# Patient Record
Sex: MALE | Race: WHITE | Employment: FULL TIME | URBAN - METROPOLITAN AREA
[De-identification: names, ages, dates, MRNs, and addresses within clinical notes are randomized per-mention and may not be internally consistent; named-entity substitution may affect disease eponyms.]

---

## 2018-08-23 ENCOUNTER — HOSPITAL ENCOUNTER (INPATIENT)
Age: 48
LOS: 1 days | Discharge: HOME | DRG: 392 | End: 2018-08-25
Attending: EMERGENCY MEDICINE | Admitting: FAMILY MEDICINE
Payer: COMMERCIAL

## 2018-08-23 ENCOUNTER — APPOINTMENT (OUTPATIENT)
Dept: CT IMAGING | Age: 48
DRG: 392 | End: 2018-08-23
Payer: COMMERCIAL

## 2018-08-23 DIAGNOSIS — K57.32 DIVERTICULITIS OF COLON: Primary | ICD-10-CM

## 2018-08-23 LAB
ABSOLUTE EOS #: 0.2 K/UL (ref 0–0.4)
ABSOLUTE IMMATURE GRANULOCYTE: ABNORMAL K/UL (ref 0–0.3)
ABSOLUTE LYMPH #: 1.6 K/UL (ref 1–4.8)
ABSOLUTE MONO #: 0.9 K/UL (ref 0.1–1.2)
ANION GAP SERPL CALCULATED.3IONS-SCNC: 12 MMOL/L (ref 9–17)
BASOPHILS # BLD: 1 % (ref 0–2)
BASOPHILS ABSOLUTE: 0.1 K/UL (ref 0–0.2)
BUN BLDV-MCNC: 21 MG/DL (ref 6–20)
BUN/CREAT BLD: ABNORMAL (ref 9–20)
CALCIUM SERPL-MCNC: 9.5 MG/DL (ref 8.6–10.4)
CHLORIDE BLD-SCNC: 98 MMOL/L (ref 98–107)
CO2: 25 MMOL/L (ref 20–31)
CREAT SERPL-MCNC: 1.01 MG/DL (ref 0.7–1.2)
DIFFERENTIAL TYPE: ABNORMAL
EOSINOPHILS RELATIVE PERCENT: 1 % (ref 1–4)
GFR AFRICAN AMERICAN: >60 ML/MIN
GFR NON-AFRICAN AMERICAN: >60 ML/MIN
GFR SERPL CREATININE-BSD FRML MDRD: ABNORMAL ML/MIN/{1.73_M2}
GFR SERPL CREATININE-BSD FRML MDRD: ABNORMAL ML/MIN/{1.73_M2}
GLUCOSE BLD-MCNC: 111 MG/DL (ref 70–99)
HCT VFR BLD CALC: 40.1 % (ref 41–53)
HEMOGLOBIN: 13.8 G/DL (ref 13.5–17.5)
IMMATURE GRANULOCYTES: ABNORMAL %
LIPASE: 18 U/L (ref 13–60)
LYMPHOCYTES # BLD: 12 % (ref 24–44)
MCH RBC QN AUTO: 30.3 PG (ref 26–34)
MCHC RBC AUTO-ENTMCNC: 34.6 G/DL (ref 31–37)
MCV RBC AUTO: 87.7 FL (ref 80–100)
MONOCYTES # BLD: 7 % (ref 2–11)
NRBC AUTOMATED: ABNORMAL PER 100 WBC
PDW BLD-RTO: 13.9 % (ref 12.5–15.4)
PLATELET # BLD: 185 K/UL (ref 140–450)
PLATELET ESTIMATE: ABNORMAL
PMV BLD AUTO: 8.9 FL (ref 6–12)
POTASSIUM SERPL-SCNC: 4.3 MMOL/L (ref 3.7–5.3)
RBC # BLD: 4.57 M/UL (ref 4.5–5.9)
RBC # BLD: ABNORMAL 10*6/UL
SEG NEUTROPHILS: 79 % (ref 36–66)
SEGMENTED NEUTROPHILS ABSOLUTE COUNT: 10.6 K/UL (ref 1.8–7.7)
SODIUM BLD-SCNC: 135 MMOL/L (ref 135–144)
WBC # BLD: 13.3 K/UL (ref 3.5–11)
WBC # BLD: ABNORMAL 10*3/UL

## 2018-08-23 PROCEDURE — 83690 ASSAY OF LIPASE: CPT

## 2018-08-23 PROCEDURE — 2580000003 HC RX 258: Performed by: EMERGENCY MEDICINE

## 2018-08-23 PROCEDURE — 36415 COLL VENOUS BLD VENIPUNCTURE: CPT

## 2018-08-23 PROCEDURE — 80048 BASIC METABOLIC PNL TOTAL CA: CPT

## 2018-08-23 PROCEDURE — 6360000004 HC RX CONTRAST MEDICATION: Performed by: EMERGENCY MEDICINE

## 2018-08-23 PROCEDURE — 99285 EMERGENCY DEPT VISIT HI MDM: CPT

## 2018-08-23 PROCEDURE — 85025 COMPLETE CBC W/AUTO DIFF WBC: CPT

## 2018-08-23 PROCEDURE — 74177 CT ABD & PELVIS W/CONTRAST: CPT

## 2018-08-23 RX ORDER — 0.9 % SODIUM CHLORIDE 0.9 %
60 INTRAVENOUS SOLUTION INTRAVENOUS ONCE
Status: COMPLETED | OUTPATIENT
Start: 2018-08-23 | End: 2018-08-23

## 2018-08-23 RX ORDER — SODIUM CHLORIDE 0.9 % (FLUSH) 0.9 %
10 SYRINGE (ML) INJECTION ONCE
Status: COMPLETED | OUTPATIENT
Start: 2018-08-23 | End: 2018-08-23

## 2018-08-23 RX ORDER — 0.9 % SODIUM CHLORIDE 0.9 %
1000 INTRAVENOUS SOLUTION INTRAVENOUS ONCE
Status: COMPLETED | OUTPATIENT
Start: 2018-08-23 | End: 2018-08-24

## 2018-08-23 RX ADMIN — SODIUM CHLORIDE 1000 ML: 9 INJECTION, SOLUTION INTRAVENOUS at 22:33

## 2018-08-23 RX ADMIN — SODIUM CHLORIDE 60 ML: 9 INJECTION, SOLUTION INTRAVENOUS at 23:01

## 2018-08-23 RX ADMIN — Medication 10 ML: at 23:01

## 2018-08-23 RX ADMIN — IOPAMIDOL 75 ML: 755 INJECTION, SOLUTION INTRAVENOUS at 23:03

## 2018-08-23 ASSESSMENT — PAIN DESCRIPTION - PAIN TYPE: TYPE: ACUTE PAIN

## 2018-08-23 ASSESSMENT — PAIN DESCRIPTION - DESCRIPTORS: DESCRIPTORS: DULL;CRAMPING

## 2018-08-23 ASSESSMENT — PAIN SCALES - GENERAL: PAINLEVEL_OUTOF10: 4

## 2018-08-23 ASSESSMENT — PAIN DESCRIPTION - FREQUENCY: FREQUENCY: CONTINUOUS

## 2018-08-23 ASSESSMENT — PAIN DESCRIPTION - LOCATION: LOCATION: ABDOMEN

## 2018-08-23 ASSESSMENT — PAIN DESCRIPTION - PROGRESSION: CLINICAL_PROGRESSION: GRADUALLY WORSENING

## 2018-08-23 ASSESSMENT — PAIN DESCRIPTION - ORIENTATION: ORIENTATION: RIGHT;LOWER

## 2018-08-24 PROBLEM — G47.00 INSOMNIA: Status: ACTIVE | Noted: 2018-08-24

## 2018-08-24 PROBLEM — F50.81 BINGE EATING DISORDER: Status: ACTIVE | Noted: 2017-10-25

## 2018-08-24 PROBLEM — K21.9 GASTROESOPHAGEAL REFLUX DISEASE WITHOUT ESOPHAGITIS: Status: ACTIVE | Noted: 2017-06-26

## 2018-08-24 PROBLEM — F41.9 ANXIETY: Status: ACTIVE | Noted: 2018-08-24

## 2018-08-24 PROBLEM — E78.2 MIXED HYPERLIPIDEMIA: Status: ACTIVE | Noted: 2017-06-26

## 2018-08-24 PROBLEM — I10 ESSENTIAL HYPERTENSION: Status: ACTIVE | Noted: 2018-08-24

## 2018-08-24 PROBLEM — E66.9 OBESITY: Status: ACTIVE | Noted: 2018-08-24

## 2018-08-24 PROBLEM — F32.A DEPRESSIVE DISORDER: Status: ACTIVE | Noted: 2018-08-24

## 2018-08-24 PROBLEM — M10.9 GOUT: Status: ACTIVE | Noted: 2017-06-26

## 2018-08-24 PROBLEM — K57.80 DIVERTICULITIS OF INTESTINE WITH PERFORATION WITHOUT ABSCESS OR BLEEDING: Status: ACTIVE | Noted: 2018-08-24

## 2018-08-24 PROBLEM — E66.01 MORBID OBESITY DUE TO EXCESS CALORIES (HCC): Status: ACTIVE | Noted: 2018-08-24

## 2018-08-24 LAB
ABSOLUTE EOS #: 0.2 K/UL (ref 0–0.4)
ABSOLUTE IMMATURE GRANULOCYTE: ABNORMAL K/UL (ref 0–0.3)
ABSOLUTE LYMPH #: 1.2 K/UL (ref 1–4.8)
ABSOLUTE MONO #: 0.8 K/UL (ref 0.1–1.2)
ANION GAP SERPL CALCULATED.3IONS-SCNC: 14 MMOL/L (ref 9–17)
BASOPHILS # BLD: 1 % (ref 0–2)
BASOPHILS ABSOLUTE: 0 K/UL (ref 0–0.2)
BILIRUBIN URINE: NEGATIVE
BUN BLDV-MCNC: 17 MG/DL (ref 6–20)
BUN/CREAT BLD: ABNORMAL (ref 9–20)
C-REACTIVE PROTEIN: 78.5 MG/L (ref 0–5)
CALCIUM SERPL-MCNC: 8.8 MG/DL (ref 8.6–10.4)
CHLORIDE BLD-SCNC: 100 MMOL/L (ref 98–107)
CHOLESTEROL/HDL RATIO: 3.1
CHOLESTEROL: 141 MG/DL
CO2: 24 MMOL/L (ref 20–31)
COLOR: YELLOW
COMMENT UA: NORMAL
CREAT SERPL-MCNC: 0.92 MG/DL (ref 0.7–1.2)
DIFFERENTIAL TYPE: ABNORMAL
EOSINOPHILS RELATIVE PERCENT: 2 % (ref 1–4)
FOLATE: 5.1 NG/ML
GFR AFRICAN AMERICAN: >60 ML/MIN
GFR NON-AFRICAN AMERICAN: >60 ML/MIN
GFR SERPL CREATININE-BSD FRML MDRD: ABNORMAL ML/MIN/{1.73_M2}
GFR SERPL CREATININE-BSD FRML MDRD: ABNORMAL ML/MIN/{1.73_M2}
GLUCOSE BLD-MCNC: 112 MG/DL (ref 70–99)
GLUCOSE URINE: NEGATIVE
HCT VFR BLD CALC: 38 % (ref 41–53)
HDLC SERPL-MCNC: 46 MG/DL
HEMOGLOBIN: 13.2 G/DL (ref 13.5–17.5)
IMMATURE GRANULOCYTES: ABNORMAL %
KETONES, URINE: NEGATIVE
LACTIC ACID: 0.6 MMOL/L (ref 0.5–2.2)
LDL CHOLESTEROL: 83 MG/DL (ref 0–130)
LEUKOCYTE ESTERASE, URINE: NEGATIVE
LYMPHOCYTES # BLD: 12 % (ref 24–44)
MCH RBC QN AUTO: 30.7 PG (ref 26–34)
MCHC RBC AUTO-ENTMCNC: 34.8 G/DL (ref 31–37)
MCV RBC AUTO: 88.3 FL (ref 80–100)
MONOCYTES # BLD: 7 % (ref 2–11)
NITRITE, URINE: NEGATIVE
NRBC AUTOMATED: ABNORMAL PER 100 WBC
PDW BLD-RTO: 13.9 % (ref 12.5–15.4)
PH UA: 5.5 (ref 5–8)
PLATELET # BLD: 180 K/UL (ref 140–450)
PLATELET ESTIMATE: ABNORMAL
PMV BLD AUTO: 8.6 FL (ref 6–12)
POTASSIUM SERPL-SCNC: 4.1 MMOL/L (ref 3.7–5.3)
PROTEIN UA: NEGATIVE
RBC # BLD: 4.3 M/UL (ref 4.5–5.9)
RBC # BLD: ABNORMAL 10*6/UL
SEG NEUTROPHILS: 78 % (ref 36–66)
SEGMENTED NEUTROPHILS ABSOLUTE COUNT: 8.4 K/UL (ref 1.8–7.7)
SODIUM BLD-SCNC: 138 MMOL/L (ref 135–144)
SPECIFIC GRAVITY UA: 1.01 (ref 1–1.03)
TRIGL SERPL-MCNC: 61 MG/DL
TSH SERPL DL<=0.05 MIU/L-ACNC: 4.41 MIU/L (ref 0.3–5)
TURBIDITY: CLEAR
URINE HGB: NEGATIVE
UROBILINOGEN, URINE: NORMAL
VITAMIN B-12: 461 PG/ML (ref 232–1245)
VLDLC SERPL CALC-MCNC: NORMAL MG/DL (ref 1–30)
WBC # BLD: 10.7 K/UL (ref 3.5–11)
WBC # BLD: ABNORMAL 10*3/UL

## 2018-08-24 PROCEDURE — 1210000000 HC MED SURG R&B

## 2018-08-24 PROCEDURE — 80061 LIPID PANEL: CPT

## 2018-08-24 PROCEDURE — 86140 C-REACTIVE PROTEIN: CPT

## 2018-08-24 PROCEDURE — 36415 COLL VENOUS BLD VENIPUNCTURE: CPT

## 2018-08-24 PROCEDURE — 82607 VITAMIN B-12: CPT

## 2018-08-24 PROCEDURE — 2500000003 HC RX 250 WO HCPCS: Performed by: EMERGENCY MEDICINE

## 2018-08-24 PROCEDURE — 85025 COMPLETE CBC W/AUTO DIFF WBC: CPT

## 2018-08-24 PROCEDURE — 6360000002 HC RX W HCPCS: Performed by: EMERGENCY MEDICINE

## 2018-08-24 PROCEDURE — 2580000003 HC RX 258: Performed by: NURSE PRACTITIONER

## 2018-08-24 PROCEDURE — 6360000002 HC RX W HCPCS: Performed by: NURSE PRACTITIONER

## 2018-08-24 PROCEDURE — 84443 ASSAY THYROID STIM HORMONE: CPT

## 2018-08-24 PROCEDURE — 80048 BASIC METABOLIC PNL TOTAL CA: CPT

## 2018-08-24 PROCEDURE — 99222 1ST HOSP IP/OBS MODERATE 55: CPT | Performed by: FAMILY MEDICINE

## 2018-08-24 PROCEDURE — 2580000003 HC RX 258: Performed by: INTERNAL MEDICINE

## 2018-08-24 PROCEDURE — 6360000002 HC RX W HCPCS: Performed by: FAMILY MEDICINE

## 2018-08-24 PROCEDURE — 82746 ASSAY OF FOLIC ACID SERUM: CPT

## 2018-08-24 PROCEDURE — 83605 ASSAY OF LACTIC ACID: CPT

## 2018-08-24 PROCEDURE — 2500000003 HC RX 250 WO HCPCS: Performed by: NURSE PRACTITIONER

## 2018-08-24 RX ORDER — SODIUM CHLORIDE 9 MG/ML
INJECTION, SOLUTION INTRAVENOUS CONTINUOUS
Status: DISCONTINUED | OUTPATIENT
Start: 2018-08-24 | End: 2018-08-25 | Stop reason: HOSPADM

## 2018-08-24 RX ORDER — SILDENAFIL 100 MG/1
100 TABLET, FILM COATED ORAL DAILY PRN
COMMUNITY

## 2018-08-24 RX ORDER — SODIUM CHLORIDE 0.9 % (FLUSH) 0.9 %
10 SYRINGE (ML) INJECTION EVERY 12 HOURS SCHEDULED
Status: DISCONTINUED | OUTPATIENT
Start: 2018-08-24 | End: 2018-08-25 | Stop reason: HOSPADM

## 2018-08-24 RX ORDER — SODIUM CHLORIDE 0.9 % (FLUSH) 0.9 %
10 SYRINGE (ML) INJECTION PRN
Status: DISCONTINUED | OUTPATIENT
Start: 2018-08-24 | End: 2018-08-25 | Stop reason: HOSPADM

## 2018-08-24 RX ORDER — ALLOPURINOL 100 MG/1
100 TABLET ORAL DAILY
COMMUNITY

## 2018-08-24 RX ORDER — ZOLPIDEM TARTRATE 10 MG/1
10 TABLET ORAL NIGHTLY
COMMUNITY

## 2018-08-24 RX ORDER — POTASSIUM CHLORIDE 7.45 MG/ML
10 INJECTION INTRAVENOUS PRN
Status: DISCONTINUED | OUTPATIENT
Start: 2018-08-24 | End: 2018-08-25 | Stop reason: HOSPADM

## 2018-08-24 RX ORDER — ASPIRIN 81 MG/1
81 TABLET ORAL DAILY
COMMUNITY

## 2018-08-24 RX ORDER — LOSARTAN POTASSIUM 50 MG/1
50 TABLET ORAL DAILY
COMMUNITY

## 2018-08-24 RX ORDER — CIPROFLOXACIN 2 MG/ML
400 INJECTION, SOLUTION INTRAVENOUS ONCE
Status: COMPLETED | OUTPATIENT
Start: 2018-08-24 | End: 2018-08-24

## 2018-08-24 RX ORDER — MAGNESIUM SULFATE 1 G/100ML
1 INJECTION INTRAVENOUS PRN
Status: DISCONTINUED | OUTPATIENT
Start: 2018-08-24 | End: 2018-08-25 | Stop reason: HOSPADM

## 2018-08-24 RX ORDER — CIPROFLOXACIN 2 MG/ML
400 INJECTION, SOLUTION INTRAVENOUS EVERY 12 HOURS
Status: DISCONTINUED | OUTPATIENT
Start: 2018-08-24 | End: 2018-08-25 | Stop reason: HOSPADM

## 2018-08-24 RX ORDER — CHLORAL HYDRATE 500 MG
1000 CAPSULE ORAL DAILY
COMMUNITY

## 2018-08-24 RX ORDER — LORAZEPAM 2 MG/ML
0.5 INJECTION INTRAMUSCULAR ONCE
Status: COMPLETED | OUTPATIENT
Start: 2018-08-24 | End: 2018-08-24

## 2018-08-24 RX ORDER — ONDANSETRON 2 MG/ML
4 INJECTION INTRAMUSCULAR; INTRAVENOUS EVERY 6 HOURS PRN
Status: DISCONTINUED | OUTPATIENT
Start: 2018-08-24 | End: 2018-08-25 | Stop reason: HOSPADM

## 2018-08-24 RX ORDER — MORPHINE SULFATE 2 MG/ML
2 INJECTION, SOLUTION INTRAMUSCULAR; INTRAVENOUS
Status: DISCONTINUED | OUTPATIENT
Start: 2018-08-24 | End: 2018-08-25 | Stop reason: HOSPADM

## 2018-08-24 RX ORDER — NICOTINE 21 MG/24HR
1 PATCH, TRANSDERMAL 24 HOURS TRANSDERMAL DAILY PRN
Status: DISCONTINUED | OUTPATIENT
Start: 2018-08-24 | End: 2018-08-24

## 2018-08-24 RX ORDER — POTASSIUM CHLORIDE 20MEQ/15ML
40 LIQUID (ML) ORAL PRN
Status: DISCONTINUED | OUTPATIENT
Start: 2018-08-24 | End: 2018-08-25 | Stop reason: HOSPADM

## 2018-08-24 RX ORDER — POTASSIUM CHLORIDE 20 MEQ/1
40 TABLET, EXTENDED RELEASE ORAL PRN
Status: DISCONTINUED | OUTPATIENT
Start: 2018-08-24 | End: 2018-08-25 | Stop reason: HOSPADM

## 2018-08-24 RX ORDER — MORPHINE SULFATE 2 MG/ML
1 INJECTION, SOLUTION INTRAMUSCULAR; INTRAVENOUS EVERY 4 HOURS PRN
Status: DISCONTINUED | OUTPATIENT
Start: 2018-08-24 | End: 2018-08-25 | Stop reason: HOSPADM

## 2018-08-24 RX ORDER — BISACODYL 10 MG
10 SUPPOSITORY, RECTAL RECTAL DAILY PRN
Status: DISCONTINUED | OUTPATIENT
Start: 2018-08-24 | End: 2018-08-25 | Stop reason: HOSPADM

## 2018-08-24 RX ORDER — COLCHICINE 0.6 MG/1
40 TABLET ORAL DAILY PRN
COMMUNITY

## 2018-08-24 RX ORDER — ACETAMINOPHEN 325 MG/1
650 TABLET ORAL EVERY 4 HOURS PRN
Status: DISCONTINUED | OUTPATIENT
Start: 2018-08-24 | End: 2018-08-25 | Stop reason: HOSPADM

## 2018-08-24 RX ORDER — ALPRAZOLAM 0.5 MG/1
0.5 TABLET ORAL EVERY 8 HOURS PRN
COMMUNITY

## 2018-08-24 RX ADMIN — SODIUM CHLORIDE: 9 INJECTION, SOLUTION INTRAVENOUS at 01:45

## 2018-08-24 RX ADMIN — MORPHINE SULFATE 2 MG: 2 INJECTION, SOLUTION INTRAMUSCULAR; INTRAVENOUS at 10:15

## 2018-08-24 RX ADMIN — SODIUM CHLORIDE: 9 INJECTION, SOLUTION INTRAVENOUS at 14:59

## 2018-08-24 RX ADMIN — SODIUM CHLORIDE: 9 INJECTION, SOLUTION INTRAVENOUS at 04:06

## 2018-08-24 RX ADMIN — SODIUM CHLORIDE: 9 INJECTION, SOLUTION INTRAVENOUS at 21:50

## 2018-08-24 RX ADMIN — CIPROFLOXACIN 400 MG: 2 INJECTION, SOLUTION INTRAVENOUS at 00:17

## 2018-08-24 RX ADMIN — METRONIDAZOLE 500 MG: 500 INJECTION, SOLUTION INTRAVENOUS at 01:19

## 2018-08-24 RX ADMIN — METRONIDAZOLE 500 MG: 500 INJECTION, SOLUTION INTRAVENOUS at 16:48

## 2018-08-24 RX ADMIN — LORAZEPAM 0.5 MG: 2 INJECTION INTRAMUSCULAR; INTRAVENOUS at 04:01

## 2018-08-24 RX ADMIN — CIPROFLOXACIN 400 MG: 2 INJECTION, SOLUTION INTRAVENOUS at 12:00

## 2018-08-24 RX ADMIN — ENOXAPARIN SODIUM 40 MG: 40 INJECTION SUBCUTANEOUS at 21:50

## 2018-08-24 RX ADMIN — METRONIDAZOLE 500 MG: 500 INJECTION, SOLUTION INTRAVENOUS at 08:25

## 2018-08-24 ASSESSMENT — PAIN SCALES - GENERAL
PAINLEVEL_OUTOF10: 4
PAINLEVEL_OUTOF10: 3
PAINLEVEL_OUTOF10: 4
PAINLEVEL_OUTOF10: 6

## 2018-08-24 ASSESSMENT — PAIN DESCRIPTION - PAIN TYPE
TYPE: ACUTE PAIN
TYPE: ACUTE PAIN

## 2018-08-24 ASSESSMENT — PAIN DESCRIPTION - DESCRIPTORS
DESCRIPTORS: ACHING;DULL
DESCRIPTORS: DULL;CRAMPING

## 2018-08-24 ASSESSMENT — PAIN DESCRIPTION - FREQUENCY
FREQUENCY: INTERMITTENT
FREQUENCY: CONTINUOUS

## 2018-08-24 ASSESSMENT — PAIN DESCRIPTION - LOCATION
LOCATION: ABDOMEN
LOCATION: ABDOMEN

## 2018-08-24 ASSESSMENT — PAIN DESCRIPTION - ORIENTATION
ORIENTATION: RIGHT;LOWER
ORIENTATION: RIGHT;LOWER

## 2018-08-24 ASSESSMENT — PAIN DESCRIPTION - PROGRESSION: CLINICAL_PROGRESSION: GRADUALLY WORSENING

## 2018-08-24 NOTE — ED PROVIDER NOTES
thought content normal.   Vitals reviewed. DIFFERENTIAL DIAGNOSIS/ MDM:   Patient's primary concern is acute appendicitis. He is tender in his right lower quadrant but does not have any surgical findings. I will check blood work, hydrated with IV fluids and get a CAT scan of his abdomen and pelvis. DIAGNOSTIC RESULTS     EKG:  None    RADIOLOGY:   Ct Abdomen Pelvis W Iv Contrast Additional Contrast? None    Result Date: 8/23/2018  EXAMINATION: CT OF THE ABDOMEN AND PELVIS WITH CONTRAST 8/23/2018 11:10 pm TECHNIQUE: CT of the abdomen and pelvis was performed with the administration of intravenous contrast. Multiplanar reformatted images are provided for review. Dose modulation, iterative reconstruction, and/or weight based adjustment of the mA/kV was utilized to reduce the radiation dose to as low as reasonably achievable. COMPARISON: None. HISTORY: ORDERING SYSTEM PROVIDED HISTORY: ABDOMINAL PAIN TECHNOLOGIST PROVIDED HISTORY: Ordering Physician Provided Reason for Exam: RLQ abdominal pain that started 2 days ago; nausea, constipation, and fever of 102. Denies abd hx or surgery Acuity: Acute Type of Exam: Initial FINDINGS: Lower Chest: Study is limited by obesity. Heart size is normal and the lungs are clear. Organs: Diffuse fatty infiltration of the liver. There are no calcified gallstones. The spleen, pancreas, adrenal glands and kidneys are normal. There is a 1 cm cyst in the right kidney. GI/Bowel: There is acute short segment diverticulitis involving the mid sigmoid colon. There is bowel wall thickening and pericolonic fat stranding. There are a few bubbles of extraluminal gas. There is no drainable fluid collection/abscess. The colon is redundant. No bowel obstruction. The appendix is normal. Pelvis: Urinary bladder is unremarkable. Peritoneum/Retroperitoneum: There is no adenopathy or free fluid. No free air. Bones/Soft Tissues: No acute bone or soft tissue abnormality.      Acute short segment diverticulitis in the mid sigmoid colon. There is pericolonic fat stranding and there are a few small bubbles of gas that appear extraluminal.  There is no drainable fluid collection/abscess.        LABS:  Results for orders placed or performed during the hospital encounter of 08/23/18   CBC auto differential   Result Value Ref Range    WBC 13.3 (H) 3.5 - 11.0 k/uL    RBC 4.57 4.5 - 5.9 m/uL    Hemoglobin 13.8 13.5 - 17.5 g/dL    Hematocrit 40.1 (L) 41 - 53 %    MCV 87.7 80 - 100 fL    MCH 30.3 26 - 34 pg    MCHC 34.6 31 - 37 g/dL    RDW 13.9 12.5 - 15.4 %    Platelets 889 018 - 929 k/uL    MPV 8.9 6.0 - 12.0 fL    NRBC Automated NOT REPORTED per 100 WBC    Differential Type NOT REPORTED     Seg Neutrophils 79 (H) 36 - 66 %    Lymphocytes 12 (L) 24 - 44 %    Monocytes 7 2 - 11 %    Eosinophils % 1 1 - 4 %    Basophils 1 0 - 2 %    Immature Granulocytes NOT REPORTED 0 %    Segs Absolute 10.60 (H) 1.8 - 7.7 k/uL    Absolute Lymph # 1.60 1.0 - 4.8 k/uL    Absolute Mono # 0.90 0.1 - 1.2 k/uL    Absolute Eos # 0.20 0.0 - 0.4 k/uL    Basophils # 0.10 0.0 - 0.2 k/uL    Absolute Immature Granulocyte NOT REPORTED 0.00 - 0.30 k/uL    WBC Morphology NOT REPORTED     RBC Morphology NOT REPORTED     Platelet Estimate NOT REPORTED    Basic Metabolic Panel w/ Reflex to MG   Result Value Ref Range    Glucose 111 (H) 70 - 99 mg/dL    BUN 21 (H) 6 - 20 mg/dL    CREATININE 1.01 0.70 - 1.20 mg/dL    Bun/Cre Ratio NOT REPORTED 9 - 20    Calcium 9.5 8.6 - 10.4 mg/dL    Sodium 135 135 - 144 mmol/L    Potassium 4.3 3.7 - 5.3 mmol/L    Chloride 98 98 - 107 mmol/L    CO2 25 20 - 31 mmol/L    Anion Gap 12 9 - 17 mmol/L    GFR Non-African American >60 >60 mL/min    GFR African American >60 >60 mL/min    GFR Comment          GFR Staging NOT REPORTED    Lipase   Result Value Ref Range    Lipase 18 13 - 60 U/L       EMERGENCY DEPARTMENT COURSE:     The patient was given the following medications:  Orders Placed This Encounter Medications    0.9 % sodium chloride bolus    0.9 % sodium chloride bolus    sodium chloride flush 0.9 % injection 10 mL    iopamidol (ISOVUE-370) 76 % injection 75 mL    ciprofloxacin (CIPRO) IVPB 400 mg    metronidazole (FLAGYL) 500 mg in NaCl 100 mL IVPB premix    sodium chloride flush 0.9 % injection 10 mL    sodium chloride flush 0.9 % injection 10 mL    OR Linked Order Group     potassium chloride (KLOR-CON M) extended release tablet 40 mEq     potassium chloride 20 MEQ/15ML (10%) oral solution 40 mEq     potassium chloride 10 mEq/100 mL IVPB (Peripheral Line)    magnesium sulfate 1 g in dextrose 5% 100 mL IVPB    magnesium hydroxide (MILK OF MAGNESIA) 400 MG/5ML suspension 30 mL    bisacodyl (DULCOLAX) suppository 10 mg    ondansetron (ZOFRAN) injection 4 mg    nicotine (NICODERM CQ) 21 MG/24HR 1 patch     If indicated/pateint smokes    acetaminophen (TYLENOL) tablet 650 mg    0.9 % sodium chloride infusion    enoxaparin (LOVENOX) injection 40 mg    metronidazole (FLAGYL) 500 mg in NaCl 100 mL IVPB premix    ciprofloxacin (CIPRO) IVPB 400 mg        Vitals:    Vitals:    08/23/18 2152 08/24/18 0100   BP: 117/71 102/62   Pulse: 98 82   Resp: 18 18   Temp: 98.6 °F (37 °C) 99.9 °F (37.7 °C)   TempSrc: Oral Oral   SpO2: 96%    Weight: (!) 158.8 kg (350 lb)    Height: 5' 11\" (1.803 m)      -------------------------  BP: 102/62, Temp: 99.9 °F (37.7 °C), Pulse: 82, Resp: 18      Re-evaluation Notes  Patient is resting comfortably and remains hemodynamically stable. I started IV antibiotics and do feel that he requires admission. I have a page has a hospital at approximately 00 10 hours. I discussed patient with the hospitalist who is kind enough to admit this patient. CONSULTS:  None    CRITICAL CARE:   None    PROCEDURES:  None    FINAL IMPRESSION      1.  Diverticulitis of colon      With perforation    DISPOSITION/PLAN   DISPOSITION Admitted 08/24/2018 12:23:15 AM      Condition on Disposition  Fair    PATIENT REFERRED TO:  Yusuf Posada MD  [place] [address], [address]  [address]  [phone]            DISCHARGE MEDICATIONS:  Current Discharge Medication List          (Please note that portions of this note were completed with a voice recognition program.  Efforts were made to edit the dictations but occasionally words are mis-transcribed.)    Farhat Wilhelm MD, F.A.C.E.P, F.A.A.E.M  Emergency Physician Attending          Farhat Wilhelm MD  08/24/18 1517

## 2018-08-24 NOTE — PLAN OF CARE
Problem: Falls - Risk of:  Goal: Will remain free from falls  Will remain free from falls   Outcome: Ongoing  Remains safe    Problem: Pain:  Goal: Pain level will decrease  Pain level will decrease   Outcome: Ongoing  IV meds    Problem: Nutritional:  Goal: Nutritional status will improve  Nutritional status will improve  Outcome: Ongoing  NPO at this time, dietician to see    Problem: Physical Regulation:  Goal: Diagnostic test results will improve  Diagnostic test results will improve  Outcome: Ongoing  Labs continuing

## 2018-08-24 NOTE — PROGRESS NOTES
Nutrition Education    Type and Reason for Visit: Patient Education    Patient groggy during diet education for low fiber and high fiber diet education. Discussed both diet educations with wife, pt nodded off several times. All questions answered to pt and wife's satisfaction. RD contact information and handouts given to patient. · Verbally reviewed following information with Patient and family: Low fiber diet education, high fiber diet education  · Written educational materials provided. · Contact name and number provided. · Refer to Patient Education activity for more details.     Selam Talamantes RD, LD  Registered Dietitian  [facility]     Office: [phone]  Cell: [phone]

## 2018-08-24 NOTE — PLAN OF CARE
Problem: Falls - Risk of:  Goal: Will remain free from falls  Will remain free from falls   Outcome: Ongoing  Falling star program in place. Side rails up x2. Call light and personal belongings within reach. Continuing to maintain safe environment. Bed in lowest position and locked. Appropriate Identification armbands in place. Non-skid foot wear in place.         Problem: Pain:  Goal: Pain level will decrease  Pain level will decrease   Outcome: Ongoing

## 2018-08-24 NOTE — H&P
aches, swelling of joints  NEUROLOGICAL:  negative for headaches, dizziness, lightheadedness, numbness, pain, tingling extremities  BEHAVIOR/PSYCH:  negative for depression, anxiety    Physical Exam:   /73   Pulse 78   Temp 98.5 °F (36.9 °C) (Oral)   Resp 16   Ht 5' 11\" (1.803 m)   Wt (!) 350 lb (158.8 kg)   SpO2 96%   BMI 48.82 kg/m²   Temp (24hrs), Av.9 °F (37.2 °C), Min:98.5 °F (36.9 °C), Max:99.9 °F (37.7 °C)    No results for input(s): POCGLU in the last 72 hours. Intake/Output Summary (Last 24 hours) at 18 1135  Last data filed at 18 0719   Gross per 24 hour   Intake              638 ml   Output              600 ml   Net               38 ml       General Appearance:  alert, well appearing, and in no acute distress  Mental status: oriented to person, place, and time with normal affect  Head:  normocephalic, atraumatic. Eye: no icterus, redness, pupils equal and reactive, extraocular eye movements intact, conjunctiva clear  Ear: normal external ear, no discharge, hearing intact  Nose:  no drainage noted  Mouth: mucous membranes moist, low hanging soft palate with diminished posterior pharyngeal space  Neck: supple, no carotid bruits, thyroid not palpable, short thick neck  Lungs: Bilateral equal air entry, clear to ausculation, no wheezing, rales or rhonchi, normal effort  Cardiovascular: normal rate, regular rhythm, no murmur, gallop, rub.   Abdomen: Soft, mildly tender mid umbilical and right lower quadrant, no rebound tenderness or guarding, nondistended, normal bowel sounds, no hepatomegaly or splenomegaly  Neurologic: There are no new focal motor or sensory deficits, normal muscle tone and bulk, no abnormal sensation, normal speech, cranial nerves II through XII grossly intact  Skin: No gross lesions, rashes, bruising or bleeding on exposed skin area  Extremities:  peripheral pulses palpable, no pedal edema or calf pain with palpation  Psych: normal affect     Investigations: Laboratory Testing:  Recent Results (from the past 24 hour(s))   CBC auto differential    Collection Time: 08/23/18 10:18 PM   Result Value Ref Range    WBC 13.3 (H) 3.5 - 11.0 k/uL    RBC 4.57 4.5 - 5.9 m/uL    Hemoglobin 13.8 13.5 - 17.5 g/dL    Hematocrit 40.1 (L) 41 - 53 %    MCV 87.7 80 - 100 fL    MCH 30.3 26 - 34 pg    MCHC 34.6 31 - 37 g/dL    RDW 13.9 12.5 - 15.4 %    Platelets 488 970 - 461 k/uL    MPV 8.9 6.0 - 12.0 fL    NRBC Automated NOT REPORTED per 100 WBC    Differential Type NOT REPORTED     Seg Neutrophils 79 (H) 36 - 66 %    Lymphocytes 12 (L) 24 - 44 %    Monocytes 7 2 - 11 %    Eosinophils % 1 1 - 4 %    Basophils 1 0 - 2 %    Immature Granulocytes NOT REPORTED 0 %    Segs Absolute 10.60 (H) 1.8 - 7.7 k/uL    Absolute Lymph # 1.60 1.0 - 4.8 k/uL    Absolute Mono # 0.90 0.1 - 1.2 k/uL    Absolute Eos # 0.20 0.0 - 0.4 k/uL    Basophils # 0.10 0.0 - 0.2 k/uL    Absolute Immature Granulocyte NOT REPORTED 0.00 - 0.30 k/uL    WBC Morphology NOT REPORTED     RBC Morphology NOT REPORTED     Platelet Estimate NOT REPORTED    Basic Metabolic Panel w/ Reflex to MG    Collection Time: 08/23/18 10:18 PM   Result Value Ref Range    Glucose 111 (H) 70 - 99 mg/dL    BUN 21 (H) 6 - 20 mg/dL    CREATININE 1.01 0.70 - 1.20 mg/dL    Bun/Cre Ratio NOT REPORTED 9 - 20    Calcium 9.5 8.6 - 10.4 mg/dL    Sodium 135 135 - 144 mmol/L    Potassium 4.3 3.7 - 5.3 mmol/L    Chloride 98 98 - 107 mmol/L    CO2 25 20 - 31 mmol/L    Anion Gap 12 9 - 17 mmol/L    GFR Non-African American >60 >60 mL/min    GFR African American >60 >60 mL/min    GFR Comment          GFR Staging NOT REPORTED    Lipase    Collection Time: 08/23/18 10:18 PM   Result Value Ref Range    Lipase 18 13 - 60 U/L   CBC WITH AUTO DIFFERENTIAL    Collection Time: 08/24/18 10:42 AM   Result Value Ref Range    WBC 10.7 3.5 - 11.0 k/uL    RBC 4.30 (L) 4.5 - 5.9 m/uL    Hemoglobin 13.2 (L) 13.5 - 17.5 g/dL    Hematocrit 38.0 (L) 41 - 53 %    MCV 88.3 80 - 100 fL    MCH 30.7 26 - 34 pg    MCHC 34.8 31 - 37 g/dL    RDW 13.9 12.5 - 15.4 %    Platelets 845 888 - 063 k/uL    MPV 8.6 6.0 - 12.0 fL    NRBC Automated NOT REPORTED per 100 WBC    Differential Type NOT REPORTED     Seg Neutrophils 78 (H) 36 - 66 %    Lymphocytes 12 (L) 24 - 44 %    Monocytes 7 2 - 11 %    Eosinophils % 2 1 - 4 %    Basophils 1 0 - 2 %    Immature Granulocytes NOT REPORTED 0 %    Segs Absolute 8.40 (H) 1.8 - 7.7 k/uL    Absolute Lymph # 1.20 1.0 - 4.8 k/uL    Absolute Mono # 0.80 0.1 - 1.2 k/uL    Absolute Eos # 0.20 0.0 - 0.4 k/uL    Basophils # 0.00 0.0 - 0.2 k/uL    Absolute Immature Granulocyte NOT REPORTED 0.00 - 0.30 k/uL    WBC Morphology NOT REPORTED     RBC Morphology NOT REPORTED     Platelet Estimate NOT REPORTED    Basic Metabolic Panel    Collection Time: 08/24/18 10:42 AM   Result Value Ref Range    Glucose 112 (H) 70 - 99 mg/dL    BUN 17 6 - 20 mg/dL    CREATININE 0.92 0.70 - 1.20 mg/dL    Bun/Cre Ratio NOT REPORTED 9 - 20    Calcium 8.8 8.6 - 10.4 mg/dL    Sodium 138 135 - 144 mmol/L    Potassium 4.1 3.7 - 5.3 mmol/L    Chloride 100 98 - 107 mmol/L    CO2 24 20 - 31 mmol/L    Anion Gap 14 9 - 17 mmol/L    GFR Non-African American >60 >60 mL/min    GFR African American >60 >60 mL/min    GFR Comment          GFR Staging NOT REPORTED    C-Reactive Protein    Collection Time: 08/24/18 10:42 AM   Result Value Ref Range    CRP 78.5 (H) 0.0 - 5.0 mg/L   Lactic Acid    Collection Time: 08/24/18 10:42 AM   Result Value Ref Range    Lactic Acid 0.6 0.5 - 2.2 mmol/L       Imaging/Diagnostics:    CT OF THE ABDOMEN AND PELVIS WITH CONTRAST 8/23/2018 11:10 pm  Impression:   Acute short segment diverticulitis in the mid sigmoid colon. There is  pericolonic fat stranding and there are a few small bubbles of gas that  appear extraluminal.  There is no drainable fluid collection/abscess.       Assessment :      Primary Problem  Diverticulitis of intestine with perforation without abscess or

## 2018-08-24 NOTE — CONSULTS
stents  Pulm: denies shortness of breath. Denies hx of asthma, COPD, or emphysema. GI:  As noted above. :  Denies hesitancy or frequency. Denies dysuria. Denies hematuria. Ext: Denies numbness, tingling, or weak in extremities. Heme:  Denies hx of dvt or pe. Denies bleeding disorders. End: Denies diabetes or thyroid problems. Psych: Negative for agitation, decreased concentration and dysphoric mood. The patient is not nervous/anxious. Denies thoughts of hurting self or others    Exam  General: AAOx3, NAD  Head: atraumatic normocephalic  Neck: trachea midline. No masses or lymphadenopathy  Abdomen: soft, nontender, and nondistended  Ext: motor 5/5 all extremities with no gross deformities    Impression:  1.  diverticulitis    Plan:  Abdominal pain resolved. Benign abdominal exam.  Patient does not have an acute surgical abdomen. Initial history was not typical since he complained of right lower quadrant pain. I reviewed CT scan. Patient with redundant sigmoid colon with inflamed sigmoid colon located in the right lower quadrant. Patient can start clear liquid diet. Transition to po antibiotics. If tolerated clears and po antibiotics, can advance diet as tolerated. Patient with resolved leukocytosis. Anticipate discharge to home tomorrow. Patient instructed to follow up with me in the office in 2 weeks. Discussed colonoscopy in 8 weeks. Patient understands and agrees with plan.            Electronically signed by Booker Rasmussen IV, DO on 8/24/18 at 5:29 PM

## 2018-08-25 VITALS
OXYGEN SATURATION: 95 % | BODY MASS INDEX: 44.1 KG/M2 | HEART RATE: 70 BPM | DIASTOLIC BLOOD PRESSURE: 71 MMHG | HEIGHT: 71 IN | TEMPERATURE: 98.1 F | RESPIRATION RATE: 16 BRPM | WEIGHT: 315 LBS | SYSTOLIC BLOOD PRESSURE: 137 MMHG

## 2018-08-25 LAB
ABSOLUTE EOS #: 0.2 K/UL (ref 0–0.4)
ABSOLUTE IMMATURE GRANULOCYTE: ABNORMAL K/UL (ref 0–0.3)
ABSOLUTE LYMPH #: 1.2 K/UL (ref 1–4.8)
ABSOLUTE MONO #: 0.7 K/UL (ref 0.1–1.2)
ANION GAP SERPL CALCULATED.3IONS-SCNC: 10 MMOL/L (ref 9–17)
BASOPHILS # BLD: 0 % (ref 0–2)
BASOPHILS ABSOLUTE: 0 K/UL (ref 0–0.2)
BUN BLDV-MCNC: 15 MG/DL (ref 6–20)
BUN/CREAT BLD: ABNORMAL (ref 9–20)
CALCIUM SERPL-MCNC: 8.9 MG/DL (ref 8.6–10.4)
CHLORIDE BLD-SCNC: 101 MMOL/L (ref 98–107)
CO2: 27 MMOL/L (ref 20–31)
CREAT SERPL-MCNC: 1.05 MG/DL (ref 0.7–1.2)
DIFFERENTIAL TYPE: ABNORMAL
EOSINOPHILS RELATIVE PERCENT: 2 % (ref 1–4)
GFR AFRICAN AMERICAN: >60 ML/MIN
GFR NON-AFRICAN AMERICAN: >60 ML/MIN
GFR SERPL CREATININE-BSD FRML MDRD: ABNORMAL ML/MIN/{1.73_M2}
GFR SERPL CREATININE-BSD FRML MDRD: ABNORMAL ML/MIN/{1.73_M2}
GLUCOSE BLD-MCNC: 106 MG/DL (ref 70–99)
HCT VFR BLD CALC: 38.3 % (ref 41–53)
HEMOGLOBIN: 13.1 G/DL (ref 13.5–17.5)
IMMATURE GRANULOCYTES: ABNORMAL %
INR BLD: 1.1
LYMPHOCYTES # BLD: 12 % (ref 24–44)
MCH RBC QN AUTO: 30.6 PG (ref 26–34)
MCHC RBC AUTO-ENTMCNC: 34.1 G/DL (ref 31–37)
MCV RBC AUTO: 89.8 FL (ref 80–100)
MONOCYTES # BLD: 7 % (ref 2–11)
NRBC AUTOMATED: ABNORMAL PER 100 WBC
PDW BLD-RTO: 13.6 % (ref 12.5–15.4)
PLATELET # BLD: 171 K/UL (ref 140–450)
PLATELET ESTIMATE: ABNORMAL
PMV BLD AUTO: 8.7 FL (ref 6–12)
POTASSIUM SERPL-SCNC: 4.5 MMOL/L (ref 3.7–5.3)
PROTHROMBIN TIME: 10.7 SEC (ref 9.4–12.6)
RBC # BLD: 4.27 M/UL (ref 4.5–5.9)
RBC # BLD: ABNORMAL 10*6/UL
SEG NEUTROPHILS: 79 % (ref 36–66)
SEGMENTED NEUTROPHILS ABSOLUTE COUNT: 8 K/UL (ref 1.8–7.7)
SODIUM BLD-SCNC: 138 MMOL/L (ref 135–144)
WBC # BLD: 10.2 K/UL (ref 3.5–11)
WBC # BLD: ABNORMAL 10*3/UL

## 2018-08-25 PROCEDURE — 36415 COLL VENOUS BLD VENIPUNCTURE: CPT

## 2018-08-25 PROCEDURE — 2500000003 HC RX 250 WO HCPCS: Performed by: NURSE PRACTITIONER

## 2018-08-25 PROCEDURE — 80048 BASIC METABOLIC PNL TOTAL CA: CPT

## 2018-08-25 PROCEDURE — 99238 HOSP IP/OBS DSCHRG MGMT 30/<: CPT | Performed by: FAMILY MEDICINE

## 2018-08-25 PROCEDURE — 85610 PROTHROMBIN TIME: CPT

## 2018-08-25 PROCEDURE — 2580000003 HC RX 258: Performed by: NURSE PRACTITIONER

## 2018-08-25 PROCEDURE — 85025 COMPLETE CBC W/AUTO DIFF WBC: CPT

## 2018-08-25 PROCEDURE — 6360000002 HC RX W HCPCS: Performed by: NURSE PRACTITIONER

## 2018-08-25 RX ORDER — METRONIDAZOLE 500 MG/1
500 TABLET ORAL 2 TIMES DAILY
Qty: 26 TABLET | Refills: 0 | Status: SHIPPED | OUTPATIENT
Start: 2018-08-25 | End: 2018-09-07

## 2018-08-25 RX ORDER — ACETAMINOPHEN 325 MG/1
650 TABLET ORAL EVERY 4 HOURS PRN
Qty: 60 TABLET | Refills: 3 | OUTPATIENT
Start: 2018-08-25 | End: 2018-09-04

## 2018-08-25 RX ORDER — CIPROFLOXACIN 500 MG/1
500 TABLET, FILM COATED ORAL 2 TIMES DAILY
Qty: 26 TABLET | Refills: 0 | Status: SHIPPED | OUTPATIENT
Start: 2018-08-25 | End: 2018-09-07

## 2018-08-25 RX ORDER — METOCLOPRAMIDE 10 MG/1
10 TABLET ORAL 4 TIMES DAILY PRN
Qty: 30 TABLET | Refills: 0 | Status: SHIPPED | OUTPATIENT
Start: 2018-08-25

## 2018-08-25 RX ADMIN — CIPROFLOXACIN 400 MG: 2 INJECTION, SOLUTION INTRAVENOUS at 00:06

## 2018-08-25 RX ADMIN — METRONIDAZOLE 500 MG: 500 INJECTION, SOLUTION INTRAVENOUS at 02:01

## 2018-08-25 RX ADMIN — METRONIDAZOLE 500 MG: 500 INJECTION, SOLUTION INTRAVENOUS at 08:12

## 2018-08-25 RX ADMIN — CIPROFLOXACIN 400 MG: 2 INJECTION, SOLUTION INTRAVENOUS at 11:26

## 2018-08-25 RX ADMIN — ENOXAPARIN SODIUM 40 MG: 40 INJECTION SUBCUTANEOUS at 08:11

## 2018-08-25 RX ADMIN — Medication 10 ML: at 08:11

## 2018-08-25 ASSESSMENT — PAIN SCALES - GENERAL: PAINLEVEL_OUTOF10: 0

## 2018-08-25 NOTE — PROGRESS NOTES
Pt discharged via wheelchair with all belongings, scripts and discharge paperwork. Follow up appointments and discharge instructions reviewed with pt and care giver. All question answered to satisfaction.

## 2018-08-25 NOTE — PROGRESS NOTES
PATIENT NAME: Troy Mason     TODAY'S DATE: 8/25/2018, 8:38 AM    SUBJECTIVE:    50 y/o male admitted for diverticulitis doing well this am.  Patient says abdominal pain has resolved. Denies nausea or vomiting. Tolerated clear liquid diet yesterday. Denies fevers, chills, or sweats. OBJECTIVE:   VITALS:  /71   Pulse 70   Temp 98.1 °F (36.7 °C) (Oral)   Resp 16   Ht 5' 11\" (1.803 m)   Wt (!) 350 lb (158.8 kg)   SpO2 95%   BMI 48.82 kg/m²      INTAKE/OUTPUT:      Intake/Output Summary (Last 24 hours) at 08/25/18 0838  Last data filed at 08/25/18 0636   Gross per 24 hour   Intake             2875 ml   Output                0 ml   Net             2875 ml                 CONSTITUTIONAL:  awake and alert. no apparent distress, No acute distress  ABDOMEN:   Abdomen soft, non-tender. BS normal. No masses,  No organomegaly, Abdomen soft, non-tender, non-distended    Data:  CBC:   Lab Results   Component Value Date    WBC 10.2 08/25/2018    RBC 4.27 08/25/2018    HGB 13.1 08/25/2018    HCT 38.3 08/25/2018    MCV 89.8 08/25/2018    MCH 30.6 08/25/2018    MCHC 34.1 08/25/2018    RDW 13.6 08/25/2018     08/25/2018    MPV 8.7 08/25/2018         ASSESSMENT   1. Diverticulitis resolving    Plan  1. Patient with benign abdominal exam.  Patient does not have an acute surgical abdomen. Diverticulitis being treated nonsurgically with antibiotics. Advance to regular diet this am.  Okay to discharge to home this afternoon if tolerates diet. Patient instructed to follow up with me in office in 2 weeks. Patient understands and agrees with plan.       Electronically signed by Maggy Rasmussen IV, DO  on 8/25/2018 at 8:38 AM

## 2018-08-26 NOTE — DISCHARGE SUMMARY
as low as reasonably achievable. COMPARISON: None. HISTORY: ORDERING SYSTEM PROVIDED HISTORY: ABDOMINAL PAIN TECHNOLOGIST PROVIDED HISTORY: Ordering Physician Provided Reason for Exam: RLQ abdominal pain that started 2 days ago; nausea, constipation, and fever of 102. Denies abd hx or surgery Acuity: Acute Type of Exam: Initial FINDINGS: Lower Chest: Study is limited by obesity. Heart size is normal and the lungs are clear. Organs: Diffuse fatty infiltration of the liver. There are no calcified gallstones. The spleen, pancreas, adrenal glands and kidneys are normal. There is a 1 cm cyst in the right kidney. GI/Bowel: There is acute short segment diverticulitis involving the mid sigmoid colon. There is bowel wall thickening and pericolonic fat stranding. There are a few bubbles of extraluminal gas. There is no drainable fluid collection/abscess. The colon is redundant. No bowel obstruction. The appendix is normal. Pelvis: Urinary bladder is unremarkable. Peritoneum/Retroperitoneum: There is no adenopathy or free fluid. No free air. Bones/Soft Tissues: No acute bone or soft tissue abnormality. Acute short segment diverticulitis in the mid sigmoid colon. There is pericolonic fat stranding and there are a few small bubbles of gas that appear extraluminal.  There is no drainable fluid collection/abscess. Consultations:    Consults:     Final Specialist Recommendations/Findings:   IP CONSULT TO GENERAL SURGERY      The patient was seen and examined on day of discharge and this discharge summary is in conjunction with any daily progress note from day of discharge.     Discharge plan:     Disposition: Home    Physician Follow Up:     Padmini Godinez MD  [place] [address], [address] [place] [address] [phone]      If symptoms worsen/diverticulitis    Goldie Nixon IV, DO  [address] [facility]  [phone]    In 2 weeks  If symptoms worsen/Diverticulitis opportunity to be involved in this patient's care.

## 2018-10-22 ENCOUNTER — ANESTHESIA (OUTPATIENT)
Dept: ENDOSCOPY | Age: 48
End: 2018-10-22
Payer: COMMERCIAL

## 2018-10-22 ENCOUNTER — HOSPITAL ENCOUNTER (OUTPATIENT)
Age: 48
Setting detail: OUTPATIENT SURGERY
Discharge: HOME | End: 2018-10-22
Attending: SURGERY | Admitting: SURGERY
Payer: COMMERCIAL

## 2018-10-22 ENCOUNTER — ANESTHESIA EVENT (OUTPATIENT)
Dept: ENDOSCOPY | Age: 48
End: 2018-10-22
Payer: COMMERCIAL

## 2018-10-22 VITALS
HEART RATE: 60 BPM | SYSTOLIC BLOOD PRESSURE: 104 MMHG | HEIGHT: 72 IN | OXYGEN SATURATION: 98 % | RESPIRATION RATE: 24 BRPM | BODY MASS INDEX: 42.66 KG/M2 | WEIGHT: 315 LBS | DIASTOLIC BLOOD PRESSURE: 51 MMHG | TEMPERATURE: 98.6 F

## 2018-10-22 VITALS
OXYGEN SATURATION: 98 % | SYSTOLIC BLOOD PRESSURE: 100 MMHG | DIASTOLIC BLOOD PRESSURE: 55 MMHG | RESPIRATION RATE: 39 BRPM

## 2018-10-22 PROCEDURE — 7100000011 HC PHASE II RECOVERY - ADDTL 15 MIN: Performed by: SURGERY

## 2018-10-22 PROCEDURE — 88305 TISSUE EXAM BY PATHOLOGIST: CPT

## 2018-10-22 PROCEDURE — 2580000003 HC RX 258: Performed by: SURGERY

## 2018-10-22 PROCEDURE — 2709999900 HC NON-CHARGEABLE SUPPLY: Performed by: SURGERY

## 2018-10-22 PROCEDURE — 3609010700 HC COLONOSCOPY POLYPECTOMY REMOVAL SNARE/STOMA: Performed by: SURGERY

## 2018-10-22 PROCEDURE — 6360000002 HC RX W HCPCS: Performed by: NURSE ANESTHETIST, CERTIFIED REGISTERED

## 2018-10-22 PROCEDURE — 3700000000 HC ANESTHESIA ATTENDED CARE: Performed by: SURGERY

## 2018-10-22 PROCEDURE — 2500000003 HC RX 250 WO HCPCS: Performed by: NURSE ANESTHETIST, CERTIFIED REGISTERED

## 2018-10-22 PROCEDURE — 3700000001 HC ADD 15 MINUTES (ANESTHESIA): Performed by: SURGERY

## 2018-10-22 PROCEDURE — 7100000010 HC PHASE II RECOVERY - FIRST 15 MIN: Performed by: SURGERY

## 2018-10-22 PROCEDURE — C1773 RET DEV, INSERTABLE: HCPCS | Performed by: SURGERY

## 2018-10-22 RX ORDER — SODIUM CHLORIDE 9 MG/ML
INJECTION, SOLUTION INTRAVENOUS CONTINUOUS
Status: DISCONTINUED | OUTPATIENT
Start: 2018-10-22 | End: 2018-10-22 | Stop reason: HOSPADM

## 2018-10-22 RX ORDER — PROPOFOL 10 MG/ML
INJECTION, EMULSION INTRAVENOUS PRN
Status: DISCONTINUED | OUTPATIENT
Start: 2018-10-22 | End: 2018-10-22 | Stop reason: SDUPTHER

## 2018-10-22 RX ORDER — LIDOCAINE HYDROCHLORIDE 10 MG/ML
INJECTION, SOLUTION EPIDURAL; INFILTRATION; INTRACAUDAL; PERINEURAL PRN
Status: DISCONTINUED | OUTPATIENT
Start: 2018-10-22 | End: 2018-10-22 | Stop reason: SDUPTHER

## 2018-10-22 RX ADMIN — LIDOCAINE HYDROCHLORIDE 50 MG: 10 INJECTION, SOLUTION EPIDURAL; INFILTRATION; INTRACAUDAL at 13:12

## 2018-10-22 RX ADMIN — PROPOFOL 30 MG: 10 INJECTION, EMULSION INTRAVENOUS at 13:58

## 2018-10-22 RX ADMIN — PROPOFOL 30 MG: 10 INJECTION, EMULSION INTRAVENOUS at 13:45

## 2018-10-22 RX ADMIN — PROPOFOL 40 MG: 10 INJECTION, EMULSION INTRAVENOUS at 13:42

## 2018-10-22 RX ADMIN — PROPOFOL 50 MG: 10 INJECTION, EMULSION INTRAVENOUS at 13:22

## 2018-10-22 RX ADMIN — PROPOFOL 40 MG: 10 INJECTION, EMULSION INTRAVENOUS at 13:14

## 2018-10-22 RX ADMIN — SODIUM CHLORIDE: 9 INJECTION, SOLUTION INTRAVENOUS at 11:53

## 2018-10-22 RX ADMIN — SODIUM CHLORIDE: 9 INJECTION, SOLUTION INTRAVENOUS at 13:40

## 2018-10-22 RX ADMIN — PROPOFOL 50 MG: 10 INJECTION, EMULSION INTRAVENOUS at 14:05

## 2018-10-22 RX ADMIN — PROPOFOL 40 MG: 10 INJECTION, EMULSION INTRAVENOUS at 13:18

## 2018-10-22 RX ADMIN — PROPOFOL 40 MG: 10 INJECTION, EMULSION INTRAVENOUS at 13:54

## 2018-10-22 RX ADMIN — PROPOFOL 40 MG: 10 INJECTION, EMULSION INTRAVENOUS at 13:52

## 2018-10-22 RX ADMIN — PROPOFOL 50 MG: 10 INJECTION, EMULSION INTRAVENOUS at 13:26

## 2018-10-22 RX ADMIN — PROPOFOL 50 MG: 10 INJECTION, EMULSION INTRAVENOUS at 14:02

## 2018-10-22 RX ADMIN — PROPOFOL 20 MG: 10 INJECTION, EMULSION INTRAVENOUS at 13:56

## 2018-10-22 RX ADMIN — PROPOFOL 40 MG: 10 INJECTION, EMULSION INTRAVENOUS at 13:16

## 2018-10-22 RX ADMIN — PROPOFOL 80 MG: 10 INJECTION, EMULSION INTRAVENOUS at 13:12

## 2018-10-22 RX ADMIN — PROPOFOL 20 MG: 10 INJECTION, EMULSION INTRAVENOUS at 13:24

## 2018-10-22 RX ADMIN — PROPOFOL 50 MG: 10 INJECTION, EMULSION INTRAVENOUS at 13:48

## 2018-10-22 RX ADMIN — PROPOFOL 50 MG: 10 INJECTION, EMULSION INTRAVENOUS at 13:50

## 2018-10-22 RX ADMIN — PROPOFOL 50 MG: 10 INJECTION, EMULSION INTRAVENOUS at 13:33

## 2018-10-22 RX ADMIN — PROPOFOL 40 MG: 10 INJECTION, EMULSION INTRAVENOUS at 13:20

## 2018-10-22 RX ADMIN — PROPOFOL 40 MG: 10 INJECTION, EMULSION INTRAVENOUS at 13:29

## 2018-10-22 RX ADMIN — PROPOFOL 50 MG: 10 INJECTION, EMULSION INTRAVENOUS at 13:40

## 2018-10-22 ASSESSMENT — PAIN SCALES - GENERAL
PAINLEVEL_OUTOF10: 0

## 2018-10-22 ASSESSMENT — PAIN - FUNCTIONAL ASSESSMENT: PAIN_FUNCTIONAL_ASSESSMENT: 0-10

## 2018-10-22 NOTE — ANESTHESIA PRE PROCEDURE
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.5    Diverticulitis of colon K57.32       Past Medical History:        Diagnosis Date    Anxiety     Depression     GERD (gastroesophageal reflux disease)     Gout     History of brain tumor     Hypertension     Insomnia        Past Surgical History:        Procedure Laterality Date    BRAIN TUMOR EXCISION  2001    [facility], Benign in ventricle of brain       Social History:    Social History   Substance Use Topics    Smoking status: Current Every Day Smoker    Smokeless tobacco: Never Used    Alcohol use Yes                                Ready to quit: Not Answered  Counseling given: Not Answered      Vital Signs (Current): There were no vitals filed for this visit.                                            BP Readings from Last 3 Encounters:   08/25/18 137/71       NPO Status:                                                                                 BMI:   Wt Readings from Last 3 Encounters:   09/10/18 (!) 347 lb (157.4 kg)   08/23/18 (!) 350 lb (158.8 kg)     There is no height or weight on file to calculate BMI.    CBC:   Lab Results   Component Value Date    WBC 10.2 08/25/2018    RBC 4.27 08/25/2018    HGB 13.1 08/25/2018    HCT 38.3 08/25/2018    MCV 89.8 08/25/2018    RDW 13.6 08/25/2018     08/25/2018       CMP:   Lab Results   Component Value Date     08/25/2018    K 4.5 08/25/2018     08/25/2018    CO2 27 08/25/2018    BUN 15 08/25/2018    CREATININE 1.05 08/25/2018    GFRAA >60 08/25/2018    LABGLOM >60 08/25/2018    GLUCOSE 106 08/25/2018

## 2018-10-22 NOTE — OP NOTE
PROCEDURE NOTE    DATE OF PROCEDURE: 10/22/2018    ENDOSCOPIST: Russel Rasmussen DO, MPH, FACS    ASSISTANT: Jayce Ventura PGY 4    PREOPERATIVE DIAGNOSIS: Hx of diverticulitis, colon cancer screening    POSTOPERATIVE DIAGNOSIS: Polyp at transverse colon, moderate diverticulosis, resolving diverticulitis, internal hemorrhoids    OPERATION: Colonoscopy with polypectomy (hot biopsy)    ANESTHESIA: MAC     ESTIMATED BLOOD LOSS: Minimal    COMPLICATIONS: None. SPECIMENS: were obtained    HISTORY: The patient is a 51y.o. year old male with history of above preop diagnosis. Colonoscopy with possible biopsy or polypectomy has been recommended and I explained the risk, benefits, expected outcome, and alternatives to the procedure. Risks included but are not limited to bleeding, infection, respiratory distress, hypotension, and perforation of the colon. The patient understands and is in agreement. PROCEDURE: The patient was given monitored anesthesia care. The patient was given oxygen by nasal cannula. The colonoscope was inserted per rectum and advanced under direct vision to the cecum without difficulty. Findings:  Cecum/Ascending colon: normal    Transverse colon: abnormal: Pedunculated polyp removed completely by snare polypectomy    Descending/Sigmoid colon: abnormal: moderate diverticulosis    Rectum/Anus: examined in normal and retroflexed positions and was abnormal: internal hemorrhoids    The colon was decompressed and the scope was removed. The patient tolerated the procedure well. Recommendations:   Follow up in clinic for biopsy results      Electronically signed by Vick Bishop DO  on 10/22/2018 at 2:12 PM

## 2018-10-22 NOTE — H&P
General Surgery   History and Physical      PATIENT NAME: Gail Rosenbaum OF BIRTH: 1970    ADMISSION DATE: 10/22/2018 11:11 AM      TODAY'S DATE: 10/22/2018    CHIEF COMPLAINT:  Diverticulitis       HISTORY OF PRESENT ILLNESS:  52 y/o male presents for follow up after recent admission for complicated diverticulitis. CT showed a microperforation with small bubble of free air but no abscess. Patient had a nonsurgical abdomen and diverticulitis was managed nonsurgically with antibiotics. Patient is doing well today. Denies abdominal pain. Denies nausea or vomiting. Denies fevers, chills, or sweats. Patient moving bowels. Denies melena or hematochezia. Past Medical History:        Diagnosis Date    Anxiety     Depression     GERD (gastroesophageal reflux disease)     Gout     History of brain tumor     Hypertension     Insomnia        Past Surgical History:        Procedure Laterality Date    BRAIN TUMOR EXCISION  2001    [address], Benign in ventricle of brain       Medications Prior to Admission:   Prescriptions Prior to Admission: metoclopramide (REGLAN) 10 MG tablet, Take 1 tablet by mouth 4 times daily as needed (nausea)  allopurinol (ZYLOPRIM) 100 MG tablet, Take 100 mg by mouth daily  colchicine (COLCRYS) 0.6 MG tablet, Take 40 mg by mouth daily as needed  zolpidem (AMBIEN) 10 MG tablet, Take 10 mg by mouth nightly. Shaneka Cotto ALPRAZolam (XANAX) 0.5 MG tablet, Take 0.5 mg by mouth every 8 hours as needed. Shaneka Cotto aspirin EC 81 MG EC tablet, Take 81 mg by mouth daily  Omega-3 Fatty Acids (FISH OIL) 1000 MG CAPS, Take 1,000 mg by mouth daily  losartan (COZAAR) 50 MG tablet, Take 50 mg by mouth daily  Multiple Vitamins-Minerals (MULTIVITAMIN ADULT PO), Take 1 tablet by mouth daily  sildenafil (VIAGRA) 100 MG tablet, Take 100 mg by mouth daily as needed  lisdexamfetamine (VYVANSE) 70 MG capsule, Take 70 mg by mouth daily. .    Allergies:  Lisinopril and Penicillins    Social History:

## 2018-10-24 LAB — SURGICAL PATHOLOGY REPORT: NORMAL

## 2022-02-25 ENCOUNTER — HOSPITAL ENCOUNTER (OUTPATIENT)
Dept: ULTRASOUND IMAGING | Facility: CLINIC | Age: 52
Discharge: HOME | End: 2022-02-27
Payer: COMMERCIAL

## 2022-02-25 DIAGNOSIS — M79.661 PAIN OF RIGHT CALF: ICD-10-CM

## 2022-02-25 PROCEDURE — 93971 EXTREMITY STUDY: CPT

## 2024-07-24 ENCOUNTER — HOSPITAL ENCOUNTER (EMERGENCY)
Age: 54
Discharge: HOME | End: 2024-07-24
Attending: EMERGENCY MEDICINE
Payer: COMMERCIAL

## 2024-07-24 ENCOUNTER — APPOINTMENT (OUTPATIENT)
Dept: CT IMAGING | Age: 54
End: 2024-07-24
Payer: COMMERCIAL

## 2024-07-24 VITALS
HEIGHT: 71 IN | DIASTOLIC BLOOD PRESSURE: 88 MMHG | TEMPERATURE: 97.9 F | OXYGEN SATURATION: 97 % | SYSTOLIC BLOOD PRESSURE: 133 MMHG | WEIGHT: 315 LBS | BODY MASS INDEX: 44.1 KG/M2 | HEART RATE: 82 BPM | RESPIRATION RATE: 14 BRPM

## 2024-07-24 DIAGNOSIS — K11.20 PAROTITIS: Primary | ICD-10-CM

## 2024-07-24 DIAGNOSIS — R73.9 HYPERGLYCEMIA: ICD-10-CM

## 2024-07-24 LAB
ANION GAP SERPL CALCULATED.3IONS-SCNC: 11 MMOL/L (ref 9–17)
B-OH-BUTYR SERPL-MCNC: 0.33 MMOL/L (ref 0.02–0.27)
BASOPHILS # BLD: 0 K/UL (ref 0–0.2)
BASOPHILS NFR BLD: 0 % (ref 0–2)
BUN SERPL-MCNC: 16 MG/DL (ref 6–20)
CALCIUM SERPL-MCNC: 9.2 MG/DL (ref 8.6–10.4)
CHLORIDE SERPL-SCNC: 96 MMOL/L (ref 98–107)
CO2 SERPL-SCNC: 25 MMOL/L (ref 20–31)
CREAT SERPL-MCNC: 1 MG/DL (ref 0.7–1.2)
EOSINOPHIL # BLD: 0.1 K/UL (ref 0–0.4)
EOSINOPHILS RELATIVE PERCENT: 1 % (ref 1–4)
ERYTHROCYTE [DISTWIDTH] IN BLOOD BY AUTOMATED COUNT: 14.3 % (ref 12.5–15.4)
GFR, ESTIMATED: 89 ML/MIN/1.73M2
GLUCOSE BLD-MCNC: 399 MG/DL (ref 75–110)
GLUCOSE SERPL-MCNC: 543 MG/DL (ref 70–99)
HCT VFR BLD AUTO: 39.2 % (ref 41–53)
HGB BLD-MCNC: 13.7 G/DL (ref 13.5–17.5)
LYMPHOCYTES NFR BLD: 1.81 K/UL (ref 1–4.8)
LYMPHOCYTES RELATIVE PERCENT: 19 % (ref 24–44)
MCH RBC QN AUTO: 30.9 PG (ref 26–34)
MCHC RBC AUTO-ENTMCNC: 35 G/DL (ref 31–37)
MCV RBC AUTO: 88.5 FL (ref 80–100)
MONOCYTES NFR BLD: 0.67 K/UL (ref 0.1–0.8)
MONOCYTES NFR BLD: 7 % (ref 1–7)
MORPHOLOGY: NORMAL
NEUTROPHILS NFR BLD: 73 % (ref 36–66)
NEUTS SEG NFR BLD: 6.92 K/UL (ref 1.8–7.7)
PLATELET # BLD AUTO: 183 K/UL (ref 140–450)
PMV BLD AUTO: 9.4 FL (ref 6–12)
POTASSIUM SERPL-SCNC: 4.2 MMOL/L (ref 3.7–5.3)
RBC # BLD AUTO: 4.43 M/UL (ref 4.5–5.9)
SODIUM SERPL-SCNC: 132 MMOL/L (ref 135–144)
WBC OTHER # BLD: 9.5 K/UL (ref 3.5–11)

## 2024-07-24 PROCEDURE — 99285 EMERGENCY DEPT VISIT HI MDM: CPT

## 2024-07-24 PROCEDURE — 80048 BASIC METABOLIC PNL TOTAL CA: CPT

## 2024-07-24 PROCEDURE — 82010 KETONE BODYS QUAN: CPT

## 2024-07-24 PROCEDURE — 6370000000 HC RX 637 (ALT 250 FOR IP): Performed by: EMERGENCY MEDICINE

## 2024-07-24 PROCEDURE — 6360000002 HC RX W HCPCS: Performed by: EMERGENCY MEDICINE

## 2024-07-24 PROCEDURE — 2580000003 HC RX 258: Performed by: EMERGENCY MEDICINE

## 2024-07-24 PROCEDURE — 96374 THER/PROPH/DIAG INJ IV PUSH: CPT

## 2024-07-24 PROCEDURE — 36415 COLL VENOUS BLD VENIPUNCTURE: CPT

## 2024-07-24 PROCEDURE — 70491 CT SOFT TISSUE NECK W/DYE: CPT

## 2024-07-24 PROCEDURE — 82947 ASSAY GLUCOSE BLOOD QUANT: CPT

## 2024-07-24 PROCEDURE — 85025 COMPLETE CBC W/AUTO DIFF WBC: CPT

## 2024-07-24 PROCEDURE — 6360000004 HC RX CONTRAST MEDICATION: Performed by: EMERGENCY MEDICINE

## 2024-07-24 RX ORDER — LEVOFLOXACIN 500 MG/1
500 TABLET, FILM COATED ORAL ONCE
Status: COMPLETED | OUTPATIENT
Start: 2024-07-24 | End: 2024-07-24

## 2024-07-24 RX ORDER — 0.9 % SODIUM CHLORIDE 0.9 %
80 INTRAVENOUS SOLUTION INTRAVENOUS ONCE
Status: DISCONTINUED | OUTPATIENT
Start: 2024-07-24 | End: 2024-07-24 | Stop reason: HOSPADM

## 2024-07-24 RX ORDER — LEVOFLOXACIN 500 MG/1
500 TABLET, FILM COATED ORAL DAILY
Qty: 10 TABLET | Refills: 0 | Status: SHIPPED | OUTPATIENT
Start: 2024-07-24 | End: 2024-08-03

## 2024-07-24 RX ORDER — METRONIDAZOLE 500 MG/1
500 TABLET ORAL ONCE
Status: COMPLETED | OUTPATIENT
Start: 2024-07-24 | End: 2024-07-24

## 2024-07-24 RX ORDER — MORPHINE SULFATE 4 MG/ML
4 INJECTION, SOLUTION INTRAMUSCULAR; INTRAVENOUS ONCE
Status: COMPLETED | OUTPATIENT
Start: 2024-07-24 | End: 2024-07-24

## 2024-07-24 RX ORDER — 0.9 % SODIUM CHLORIDE 0.9 %
1000 INTRAVENOUS SOLUTION INTRAVENOUS ONCE
Status: COMPLETED | OUTPATIENT
Start: 2024-07-24 | End: 2024-07-24

## 2024-07-24 RX ORDER — SODIUM CHLORIDE 0.9 % (FLUSH) 0.9 %
10 SYRINGE (ML) INJECTION PRN
Status: DISCONTINUED | OUTPATIENT
Start: 2024-07-24 | End: 2024-07-24 | Stop reason: HOSPADM

## 2024-07-24 RX ORDER — METRONIDAZOLE 500 MG/1
500 TABLET ORAL 2 TIMES DAILY
Qty: 20 TABLET | Refills: 0 | Status: SHIPPED | OUTPATIENT
Start: 2024-07-24 | End: 2024-08-03

## 2024-07-24 RX ADMIN — LEVOFLOXACIN 500 MG: 500 TABLET, FILM COATED ORAL at 19:05

## 2024-07-24 RX ADMIN — IOPAMIDOL 75 ML: 755 INJECTION, SOLUTION INTRAVENOUS at 17:05

## 2024-07-24 RX ADMIN — Medication 80 ML: at 17:05

## 2024-07-24 RX ADMIN — INSULIN HUMAN 10 UNITS: 100 INJECTION, SOLUTION PARENTERAL at 17:43

## 2024-07-24 RX ADMIN — SODIUM CHLORIDE, PRESERVATIVE FREE 10 ML: 5 INJECTION INTRAVENOUS at 17:05

## 2024-07-24 RX ADMIN — METRONIDAZOLE 500 MG: 500 TABLET ORAL at 19:06

## 2024-07-24 RX ADMIN — SODIUM CHLORIDE 1000 ML: 9 INJECTION, SOLUTION INTRAVENOUS at 17:41

## 2024-07-24 RX ADMIN — MORPHINE SULFATE 4 MG: 4 INJECTION INTRAVENOUS at 17:41

## 2024-07-24 ASSESSMENT — PAIN DESCRIPTION - LOCATION: LOCATION: JAW

## 2024-07-24 ASSESSMENT — PAIN DESCRIPTION - PAIN TYPE: TYPE: ACUTE PAIN

## 2024-07-24 ASSESSMENT — PAIN DESCRIPTION - ORIENTATION: ORIENTATION: RIGHT

## 2024-07-24 ASSESSMENT — PAIN - FUNCTIONAL ASSESSMENT: PAIN_FUNCTIONAL_ASSESSMENT: 0-10

## 2024-07-24 ASSESSMENT — PAIN SCALES - GENERAL: PAINLEVEL_OUTOF10: 6

## 2024-07-24 NOTE — ED PROVIDER NOTES
[facility] Emergency Department  [address]  [facility] [address]  Phone: [phone]  Fax: [phone]  EMERGENCY DEPARTMENT ENCOUNTER      Pt Name: Peewee Valdez  MRN: [id number]  Birthdate 1970  Date of evaluation: 7/24/2024    CHIEF COMPLAINT       Chief Complaint   Patient presents with    Jaw Pain     Pt arrives with co right sided jaw pain which was noted yesterday. Pt states the pain has worsened over the course of today. Pt states he does have right ear pain as well. Pt states he does have diagnosis of TMJ however has not had flare up. Pt states he did take 1000mg of tylenol at 1300 and motrin 800mg     Otalgia       HISTORY OF PRESENT ILLNESS    Peewee Valdez is a 54 y.o. male who presents to the emergency department complaining of right jaw pain and swelling.  Patient has a history of TMJ syndrome he said that yesterday he started to develop pain on his right side and then developed some swelling.  He denies any fevers or chills.  No cough congestion no sore throat.  Positive right-sided earache.  He has been taking Tylenol Motrin without relief.  Feels that this pain is worse than what he typically has swelling.    Also patient is a known diabetic and recently started on insulin and says that his sugars are in the 400s and he is working with his doctor to bring them down.      He rates his pain 6 out of 10    REVIEW OF SYSTEMS       Constitutional: No fevers or chills   HENT: No sore throat, rhinorrhea, positive right earache   Eyes: No blurry vision or double vision no drainage   Cardiovascular: No chest pain or tachycardia   Respiratory: No wheezing or shortness of breath no cough   Gastrointestinal: No nausea, vomiting, diarrhea, constipation, or abdominal pain   : No hematuria or dysuria   Musculoskeletal: No extremity swelling or pain positive facial swelling  Skin: No rash   Neurological: No focal neurologic complaints, paresthesias, weakness, or headache

## 2024-07-24 NOTE — DISCHARGE INSTRUCTIONS
Monitor your blood sugar closely  Call your doctor tomorrow  Take medications as prescribed    Return immediately if any worsening symptoms or any other concerns    Please understand that early in the process of an illness or injury, an emergency department workup can be falsely reassuring.      Tell us how we did visit: [email or web address]   and let us know about your experience

## 2024-08-15 ENCOUNTER — ANESTHESIA EVENT (OUTPATIENT)
Dept: OPERATING ROOM | Age: 54
End: 2024-08-15
Payer: COMMERCIAL

## 2024-08-16 ENCOUNTER — ANESTHESIA (OUTPATIENT)
Dept: OPERATING ROOM | Age: 54
End: 2024-08-16
Payer: COMMERCIAL

## 2024-08-16 ENCOUNTER — HOSPITAL ENCOUNTER (OUTPATIENT)
Age: 54
Setting detail: OUTPATIENT SURGERY
Discharge: HOME | End: 2024-08-16
Attending: SURGERY | Admitting: SURGERY
Payer: COMMERCIAL

## 2024-08-16 VITALS
RESPIRATION RATE: 17 BRPM | BODY MASS INDEX: 44.1 KG/M2 | HEIGHT: 71 IN | WEIGHT: 315 LBS | DIASTOLIC BLOOD PRESSURE: 80 MMHG | SYSTOLIC BLOOD PRESSURE: 135 MMHG | TEMPERATURE: 97.9 F | HEART RATE: 64 BPM | OXYGEN SATURATION: 97 %

## 2024-08-16 DIAGNOSIS — Z12.11 SCREEN FOR COLON CANCER: ICD-10-CM

## 2024-08-16 PROCEDURE — 7100000010 HC PHASE II RECOVERY - FIRST 15 MIN: Performed by: SURGERY

## 2024-08-16 PROCEDURE — 2500000003 HC RX 250 WO HCPCS: Performed by: NURSE ANESTHETIST, CERTIFIED REGISTERED

## 2024-08-16 PROCEDURE — 3609010300 HC COLONOSCOPY W/BIOPSY SINGLE/MULTIPLE: Performed by: SURGERY

## 2024-08-16 PROCEDURE — 3700000001 HC ADD 15 MINUTES (ANESTHESIA): Performed by: SURGERY

## 2024-08-16 PROCEDURE — 6360000002 HC RX W HCPCS: Performed by: NURSE ANESTHETIST, CERTIFIED REGISTERED

## 2024-08-16 PROCEDURE — 2709999900 HC NON-CHARGEABLE SUPPLY: Performed by: SURGERY

## 2024-08-16 PROCEDURE — 2580000003 HC RX 258: Performed by: ANESTHESIOLOGY

## 2024-08-16 PROCEDURE — 3700000000 HC ANESTHESIA ATTENDED CARE: Performed by: SURGERY

## 2024-08-16 PROCEDURE — 88305 TISSUE EXAM BY PATHOLOGIST: CPT

## 2024-08-16 PROCEDURE — 7100000011 HC PHASE II RECOVERY - ADDTL 15 MIN: Performed by: SURGERY

## 2024-08-16 RX ORDER — OXYCODONE HYDROCHLORIDE AND ACETAMINOPHEN 5; 325 MG/1; MG/1
1 TABLET ORAL
Status: DISCONTINUED | OUTPATIENT
Start: 2024-08-16 | End: 2024-08-16 | Stop reason: HOSPADM

## 2024-08-16 RX ORDER — DEXMEDETOMIDINE HYDROCHLORIDE 100 UG/ML
INJECTION, SOLUTION INTRAVENOUS PRN
Status: DISCONTINUED | OUTPATIENT
Start: 2024-08-16 | End: 2024-08-16 | Stop reason: SDUPTHER

## 2024-08-16 RX ORDER — MIDAZOLAM HYDROCHLORIDE 2 MG/2ML
2 INJECTION, SOLUTION INTRAMUSCULAR; INTRAVENOUS
Status: DISCONTINUED | OUTPATIENT
Start: 2024-08-16 | End: 2024-08-16 | Stop reason: HOSPADM

## 2024-08-16 RX ORDER — SODIUM CHLORIDE 0.9 % (FLUSH) 0.9 %
5-40 SYRINGE (ML) INJECTION PRN
Status: DISCONTINUED | OUTPATIENT
Start: 2024-08-16 | End: 2024-08-16 | Stop reason: HOSPADM

## 2024-08-16 RX ORDER — SODIUM CHLORIDE 0.9 % (FLUSH) 0.9 %
5-40 SYRINGE (ML) INJECTION EVERY 12 HOURS SCHEDULED
Status: DISCONTINUED | OUTPATIENT
Start: 2024-08-16 | End: 2024-08-16 | Stop reason: HOSPADM

## 2024-08-16 RX ORDER — MEPERIDINE HYDROCHLORIDE 50 MG/ML
12.5 INJECTION INTRAMUSCULAR; INTRAVENOUS; SUBCUTANEOUS EVERY 5 MIN PRN
Status: DISCONTINUED | OUTPATIENT
Start: 2024-08-16 | End: 2024-08-16 | Stop reason: HOSPADM

## 2024-08-16 RX ORDER — HYDRALAZINE HYDROCHLORIDE 20 MG/ML
10 INJECTION INTRAMUSCULAR; INTRAVENOUS
Status: DISCONTINUED | OUTPATIENT
Start: 2024-08-16 | End: 2024-08-16 | Stop reason: HOSPADM

## 2024-08-16 RX ORDER — NALOXONE HYDROCHLORIDE 0.4 MG/ML
INJECTION, SOLUTION INTRAMUSCULAR; INTRAVENOUS; SUBCUTANEOUS PRN
Status: DISCONTINUED | OUTPATIENT
Start: 2024-08-16 | End: 2024-08-16 | Stop reason: HOSPADM

## 2024-08-16 RX ORDER — DIPHENHYDRAMINE HYDROCHLORIDE 50 MG/ML
12.5 INJECTION INTRAMUSCULAR; INTRAVENOUS
Status: DISCONTINUED | OUTPATIENT
Start: 2024-08-16 | End: 2024-08-16 | Stop reason: HOSPADM

## 2024-08-16 RX ORDER — GLYCOPYRROLATE 0.2 MG/ML
0.4 INJECTION INTRAMUSCULAR; INTRAVENOUS ONCE
Status: DISCONTINUED | OUTPATIENT
Start: 2024-08-16 | End: 2024-08-16 | Stop reason: HOSPADM

## 2024-08-16 RX ORDER — GLYCOPYRROLATE 1 MG/5 ML
SYRINGE (ML) INTRAVENOUS PRN
Status: DISCONTINUED | OUTPATIENT
Start: 2024-08-16 | End: 2024-08-16 | Stop reason: SDUPTHER

## 2024-08-16 RX ORDER — SODIUM CHLORIDE 9 MG/ML
INJECTION, SOLUTION INTRAVENOUS CONTINUOUS
Status: DISCONTINUED | OUTPATIENT
Start: 2024-08-16 | End: 2024-08-16 | Stop reason: HOSPADM

## 2024-08-16 RX ORDER — METOCLOPRAMIDE HYDROCHLORIDE 5 MG/ML
10 INJECTION INTRAMUSCULAR; INTRAVENOUS
Status: DISCONTINUED | OUTPATIENT
Start: 2024-08-16 | End: 2024-08-16 | Stop reason: HOSPADM

## 2024-08-16 RX ORDER — PROPOFOL 10 MG/ML
INJECTION, EMULSION INTRAVENOUS PRN
Status: DISCONTINUED | OUTPATIENT
Start: 2024-08-16 | End: 2024-08-16 | Stop reason: SDUPTHER

## 2024-08-16 RX ORDER — MIDAZOLAM HYDROCHLORIDE 1 MG/ML
INJECTION INTRAMUSCULAR; INTRAVENOUS PRN
Status: DISCONTINUED | OUTPATIENT
Start: 2024-08-16 | End: 2024-08-16 | Stop reason: SDUPTHER

## 2024-08-16 RX ORDER — LABETALOL HYDROCHLORIDE 5 MG/ML
10 INJECTION, SOLUTION INTRAVENOUS
Status: DISCONTINUED | OUTPATIENT
Start: 2024-08-16 | End: 2024-08-16 | Stop reason: HOSPADM

## 2024-08-16 RX ORDER — SODIUM CHLORIDE, SODIUM LACTATE, POTASSIUM CHLORIDE, CALCIUM CHLORIDE 600; 310; 30; 20 MG/100ML; MG/100ML; MG/100ML; MG/100ML
INJECTION, SOLUTION INTRAVENOUS CONTINUOUS
Status: DISCONTINUED | OUTPATIENT
Start: 2024-08-16 | End: 2024-08-16 | Stop reason: HOSPADM

## 2024-08-16 RX ORDER — OXYCODONE HYDROCHLORIDE AND ACETAMINOPHEN 5; 325 MG/1; MG/1
2 TABLET ORAL
Status: DISCONTINUED | OUTPATIENT
Start: 2024-08-16 | End: 2024-08-16 | Stop reason: HOSPADM

## 2024-08-16 RX ORDER — ONDANSETRON 2 MG/ML
4 INJECTION INTRAMUSCULAR; INTRAVENOUS
Status: DISCONTINUED | OUTPATIENT
Start: 2024-08-16 | End: 2024-08-16 | Stop reason: HOSPADM

## 2024-08-16 RX ORDER — SODIUM CHLORIDE 9 MG/ML
INJECTION, SOLUTION INTRAVENOUS PRN
Status: DISCONTINUED | OUTPATIENT
Start: 2024-08-16 | End: 2024-08-16 | Stop reason: HOSPADM

## 2024-08-16 RX ORDER — IPRATROPIUM BROMIDE AND ALBUTEROL SULFATE 2.5; .5 MG/3ML; MG/3ML
1 SOLUTION RESPIRATORY (INHALATION)
Status: DISCONTINUED | OUTPATIENT
Start: 2024-08-16 | End: 2024-08-16 | Stop reason: HOSPADM

## 2024-08-16 RX ORDER — MORPHINE SULFATE 2 MG/ML
2 INJECTION, SOLUTION INTRAMUSCULAR; INTRAVENOUS EVERY 5 MIN PRN
Status: DISCONTINUED | OUTPATIENT
Start: 2024-08-16 | End: 2024-08-16 | Stop reason: HOSPADM

## 2024-08-16 RX ADMIN — DEXMEDETOMIDINE HCL 8 MCG: 100 INJECTION INTRAVENOUS at 12:04

## 2024-08-16 RX ADMIN — PROPOFOL 150 MCG/KG/MIN: 10 INJECTION, EMULSION INTRAVENOUS at 12:05

## 2024-08-16 RX ADMIN — PROPOFOL 25 MG: 10 INJECTION, EMULSION INTRAVENOUS at 12:07

## 2024-08-16 RX ADMIN — DEXMEDETOMIDINE HCL 8 MCG: 100 INJECTION INTRAVENOUS at 12:07

## 2024-08-16 RX ADMIN — Medication 0.2 MG: at 12:07

## 2024-08-16 RX ADMIN — MIDAZOLAM 2 MG: 1 INJECTION INTRAMUSCULAR; INTRAVENOUS at 12:01

## 2024-08-16 RX ADMIN — SODIUM CHLORIDE, POTASSIUM CHLORIDE, SODIUM LACTATE AND CALCIUM CHLORIDE: 600; 310; 30; 20 INJECTION, SOLUTION INTRAVENOUS at 11:01

## 2024-08-16 RX ADMIN — PROPOFOL 40 MG: 10 INJECTION, EMULSION INTRAVENOUS at 12:04

## 2024-08-16 ASSESSMENT — LIFESTYLE VARIABLES: SMOKING_STATUS: 1

## 2024-08-16 ASSESSMENT — PAIN - FUNCTIONAL ASSESSMENT
PAIN_FUNCTIONAL_ASSESSMENT: 0-10
PAIN_FUNCTIONAL_ASSESSMENT: NONE - DENIES PAIN

## 2024-08-16 NOTE — H&P
HxCC:  53 y/o male here for a colonoscopy consult.  Colonoscopy in 2018 showed a polyp in the transverse colon. Patient was recommend to have a repeat colonoscopy in 5 years.  Denies abdominal pain, constipation, diarrhea.  Denies family hx of colon cancer.  Denies melena or hematochezia.  Denies changes in bowel habits.  Denies changes in caliber of stools.  Denies hx of excessive weight loss.  Denies fevers, chills, or sweats.  Denies nausea or vomiting.          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        ALPRAZolam (XANAX) 0.5 MG tablet Take 0.5 mg by mouth every 8 hours as needed..        aspirin EC 81 MG EC tablet Take 81 mg by mouth daily        Omega-3 Fatty Acids (FISH OIL) 1000 MG CAPS Take 1,000 mg by mouth daily        losartan (COZAAR) 50 MG tablet Take 50 mg by mouth daily        Multiple Vitamins-Minerals (MULTIVITAMIN ADULT PO) Take 1 tablet by mouth daily        sildenafil (VIAGRA) 100 MG tablet Take 100 mg by mouth daily as needed        lisdexamfetamine (VYVANSE) 70 MG capsule Take 70 mg by mouth daily..

## 2024-08-16 NOTE — DISCHARGE INSTRUCTIONS
Patient Discharge Instructions  Discharge Date:  8/16/24    HYGEINE: Ok to shower, no restrictions to regular daily hygiene.     DRIVING: No driving for 24 hours due to anethesia.    ACTIVITY: Activity as tolerated.     DIET: Resume your normal diet as advised by your PCP.    MEDICATIONS: Resume home medications as directed by your PCP. Hold aspirin for 2 days.    SPECIAL INSTRUCTIONS:     Follow up with Dr. Rasmussen in one week to discuss biopsy results. Call sooner if any concerning signs or symptoms develop.

## 2024-08-16 NOTE — ANESTHESIA PRE PROCEDURE
exam         Pulmonary:normal exam    (+)  COPD:    sleep apnea: on CPAP,       current smoker                          ROS comment: -SMOKES 1 PPD FOR 34 YEARS   Cardiovascular:    (+) hypertension:                  Neuro/Psych:                ROS comment: -HX OF BRAIN TUMOR S/P EXCISION GI/Hepatic/Renal:   (+) GERD: well controlled, morbid obesity          Endo/Other:    (+) malignancy/cancer.                  ROS comment: -SKIN CANCER  -NPO AFTER MIDNIGHT  -ALLERGIES - LISINOPRIL, PCN Abdominal: normal exam            Vascular: negative vascular ROS.         Other Findings:       Anesthesia Plan      MAC     ASA 3       Induction: intravenous.    MIPS: Postoperative opioids intended and Prophylactic antiemetics administered.  Anesthetic plan and risks discussed with patient.      Plan discussed with CRNA.    Attending anesthesiologist reviewed and agrees with Preprocedure content            NATHAN PIERCE MD   8/16/2024

## 2024-08-16 NOTE — OP NOTE
Operative Note      Patient: Peewee Valdez  YOB: 1970  MRN: [id number]    Date of Procedure: 8/16/2024    Pre-Op Diagnosis Codes:      * Screen for colon cancer [Z12.11]    Post-Op Diagnosis: Same       Procedure(s):  COLONOSCOPY BIOPSY    Surgeon(s):  Pietro Rasmussen IV, DO    Assistant:   Resident: Gonzalez Fall DO, Megan Crotty, DO PGY-5    Anesthesia: Monitor Anesthesia Care    Estimated Blood Loss (mL): Minimal    Complications: None    Specimens:   ID Type Source Tests Collected by Time Destination   A : cecal polyp Tissue Tissue SURGICAL PATHOLOGY Pietro Rasmussen IV,  8/16/2024 1227    B : right colon polyp Tissue Tissue SURGICAL PATHOLOGY Pietro Rasmussen IV,  8/16/2024 1234        Implants:  * No implants in log *      Drains: * No LDAs found *    Findings:  Infection Present At Time Of Surgery (PATOS) (choose all levels that have infection present):  No infection present  Other Findings: Cecal and ascending colon polyps; taken for pathology    Indications:  Patient is a 54 year old male who presents for colonoscopy. His last colonoscopy in 2018 showed a polyp in the transverse colon. Decision was made to proceed with colonoscopy, possible biopsy, possible polypectomy. The risks, benefits, and alternatives to surgery were discussed with the patient and all questions were answered. Written consent was obtained and witnessed.     Detailed Description of Procedure:       PROCEDURE: The patient was given monitored anesthesia care. The patient was given oxygen by nasal cannula. The colonoscope was inserted per rectum and advanced under direct vision to the cecum without difficulty.     Findings:  Cecum/Ascending colon: cecal polyp (taken with biopsy forceps) and ascending colon polyp (taken with hot snare); otherwise normal     Transverse colon: normal    Descending/Sigmoid colon: moderate diverticular disease     Rectum/Anus: examined in normal and retroflexed positions and

## 2024-08-16 NOTE — ANESTHESIA POSTPROCEDURE EVALUATION
Department of Anesthesiology  Postprocedure Note    Patient: Peewee Valdez  MRN: [id number]  YOB: 1970  Date of evaluation: 8/16/2024    Procedure Summary       Date: 08/16/24 Room / Location: [address]    Anesthesia Start: 1201 Anesthesia Stop: 1314    Procedure: COLONOSCOPY BIOPSY Diagnosis:       Screen for colon cancer      (Screen for colon cancer [Z12.11])    Surgeons: Pietro Rasmussen IV, DO Responsible Provider: Garret Reis MD    Anesthesia Type: MAC ASA Status: 3            Anesthesia Type: No value filed.    Jocelynn Phase I: Jocelynn Score: 10    Jocelynn Phase II: Jocelynn Score: 9    Anesthesia Post Evaluation    Patient location during evaluation: PACU  Patient participation: complete - patient participated  Level of consciousness: awake and alert  Airway patency: patent  Nausea & Vomiting: no nausea and no vomiting  Cardiovascular status: hemodynamically stable  Respiratory status: room air and spontaneous ventilation  Hydration status: euvolemic  Multimodal analgesia pain management approach  Pain management: adequate    No notable events documented.

## 2024-08-20 LAB — SURGICAL PATHOLOGY REPORT: NORMAL

## (undated) DEVICE — Device: Brand: DEFENDO VALVE AND CONNECTOR KIT

## (undated) DEVICE — CONNECTOR TBNG AUX H2O JET DISP FOR OLY 160/180 SER

## (undated) DEVICE — FORCEPS BX L240CM JAW DIA2.4MM ORNG L CAP W/ NDL DISP RAD

## (undated) DEVICE — SNARE ENDOSCP L240CM LOOP W13MM SHTH DIA2.4MM SM OVL FLX

## (undated) DEVICE — SOLUTION IRRIG 1000ML STRL H2O USP PLAS POUR BTL

## (undated) DEVICE — POLYP TRAP: Brand: TRAPEASE®

## (undated) DEVICE — ERBE NESSY®PLATE 170 SPLIT; 168CM²; CABLE 3M: Brand: ERBE

## (undated) DEVICE — ADAPTER CLEANING PORPOISE CLEANING

## (undated) DEVICE — FORCEPS BX L240CM WRK CHN 2.8MM STD CAP W/ NDL MIC MESH

## (undated) DEVICE — PERRYSBURG ENDO PACK: Brand: MEDLINE INDUSTRIES, INC.

## (undated) DEVICE — SNARE ENDOSCP M L240CM LOOP W27MM SHTH DIA2.4MM OVL FLX